# Patient Record
Sex: FEMALE | Race: WHITE | NOT HISPANIC OR LATINO
[De-identification: names, ages, dates, MRNs, and addresses within clinical notes are randomized per-mention and may not be internally consistent; named-entity substitution may affect disease eponyms.]

---

## 2019-06-21 ENCOUNTER — APPOINTMENT (OUTPATIENT)
Dept: ORTHOPEDIC SURGERY | Facility: CLINIC | Age: 79
End: 2019-06-21
Payer: MEDICARE

## 2019-06-21 VITALS — WEIGHT: 132 LBS | BODY MASS INDEX: 21.99 KG/M2 | HEIGHT: 65 IN

## 2019-06-21 DIAGNOSIS — M25.571 PAIN IN RIGHT ANKLE AND JOINTS OF RIGHT FOOT: ICD-10-CM

## 2019-06-21 PROBLEM — Z00.00 ENCOUNTER FOR PREVENTIVE HEALTH EXAMINATION: Status: ACTIVE | Noted: 2019-06-21

## 2019-06-21 PROCEDURE — 99213 OFFICE O/P EST LOW 20 MIN: CPT

## 2019-06-21 PROCEDURE — 73610 X-RAY EXAM OF ANKLE: CPT | Mod: RT

## 2019-06-21 NOTE — DISCUSSION/SUMMARY
[de-identified] : This patient seems to have peroneal tendinitis. I recommended ice and elevation. If the swelling doesn't get better she will let me know. Followup will be as needed.

## 2019-06-21 NOTE — PHYSICAL EXAM
[de-identified] : Right ankle shows no warmth. There is lateral swelling. There is no tenderness of any significance. There is minimal discomfort over the peroneal tendons. Full range of motion negative instability neurovascular exam is normal. [de-identified] : X-ray of the right ankle shows no evidence of fracture or dislocation.

## 2019-06-21 NOTE — HISTORY OF PRESENT ILLNESS
[FreeTextEntry1] : Location: right ankle \par Quality: aching\par Duration:2 weeks \par Context: atraumatic\par Aggravating Factors: prolonged walking , certain shoes \par Conservative treatment: elevation , rest\par Associated Symptoms: swelling\par Prior Studies:n.a\par

## 2019-06-30 ENCOUNTER — INPATIENT (INPATIENT)
Facility: HOSPITAL | Age: 79
LOS: 2 days | Discharge: HOME | DRG: 390 | End: 2019-07-03
Attending: COLON & RECTAL SURGERY | Admitting: COLON & RECTAL SURGERY
Payer: MEDICARE

## 2019-06-30 VITALS
DIASTOLIC BLOOD PRESSURE: 78 MMHG | TEMPERATURE: 98 F | OXYGEN SATURATION: 95 % | RESPIRATION RATE: 16 BRPM | SYSTOLIC BLOOD PRESSURE: 134 MMHG | HEART RATE: 83 BPM

## 2019-06-30 DIAGNOSIS — Z90.49 ACQUIRED ABSENCE OF OTHER SPECIFIED PARTS OF DIGESTIVE TRACT: Chronic | ICD-10-CM

## 2019-06-30 DIAGNOSIS — Z98.89 OTHER SPECIFIED POSTPROCEDURAL STATES: Chronic | ICD-10-CM

## 2019-06-30 LAB
ANION GAP SERPL CALC-SCNC: 13 MMOL/L — SIGNIFICANT CHANGE UP (ref 5–17)
APTT BLD: 24.8 SEC — LOW (ref 27.5–36.3)
BLD GP AB SCN SERPL QL: NEGATIVE — SIGNIFICANT CHANGE UP
BUN SERPL-MCNC: 15 MG/DL — SIGNIFICANT CHANGE UP (ref 7–23)
CALCIUM SERPL-MCNC: 8.7 MG/DL — SIGNIFICANT CHANGE UP (ref 8.4–10.5)
CHLORIDE SERPL-SCNC: 103 MMOL/L — SIGNIFICANT CHANGE UP (ref 96–108)
CO2 SERPL-SCNC: 22 MMOL/L — SIGNIFICANT CHANGE UP (ref 22–31)
CREAT SERPL-MCNC: 0.79 MG/DL — SIGNIFICANT CHANGE UP (ref 0.5–1.3)
GLUCOSE SERPL-MCNC: 96 MG/DL — SIGNIFICANT CHANGE UP (ref 70–99)
HCT VFR BLD CALC: 39 % — SIGNIFICANT CHANGE UP (ref 34.5–45)
HGB BLD-MCNC: 13.5 G/DL — SIGNIFICANT CHANGE UP (ref 11.5–15.5)
INR BLD: 1.04 — SIGNIFICANT CHANGE UP (ref 0.88–1.16)
MAGNESIUM SERPL-MCNC: 2.3 MG/DL — SIGNIFICANT CHANGE UP (ref 1.6–2.6)
MCHC RBC-ENTMCNC: 33.3 PG — SIGNIFICANT CHANGE UP (ref 27–34)
MCHC RBC-ENTMCNC: 34.6 GM/DL — SIGNIFICANT CHANGE UP (ref 32–36)
MCV RBC AUTO: 96.3 FL — SIGNIFICANT CHANGE UP (ref 80–100)
NRBC # BLD: 0 /100 WBCS — SIGNIFICANT CHANGE UP (ref 0–0)
PHOSPHATE SERPL-MCNC: 3.1 MG/DL — SIGNIFICANT CHANGE UP (ref 2.5–4.5)
PLATELET # BLD AUTO: 248 K/UL — SIGNIFICANT CHANGE UP (ref 150–400)
POTASSIUM SERPL-MCNC: 3.6 MMOL/L — SIGNIFICANT CHANGE UP (ref 3.5–5.3)
POTASSIUM SERPL-SCNC: 3.6 MMOL/L — SIGNIFICANT CHANGE UP (ref 3.5–5.3)
PROTHROM AB SERPL-ACNC: 11.8 SEC — SIGNIFICANT CHANGE UP (ref 10–12.9)
RBC # BLD: 4.05 M/UL — SIGNIFICANT CHANGE UP (ref 3.8–5.2)
RBC # FLD: 12.5 % — SIGNIFICANT CHANGE UP (ref 10.3–14.5)
RH IG SCN BLD-IMP: POSITIVE — SIGNIFICANT CHANGE UP
SODIUM SERPL-SCNC: 138 MMOL/L — SIGNIFICANT CHANGE UP (ref 135–145)
WBC # BLD: 7.38 K/UL — SIGNIFICANT CHANGE UP (ref 3.8–10.5)
WBC # FLD AUTO: 7.38 K/UL — SIGNIFICANT CHANGE UP (ref 3.8–10.5)

## 2019-06-30 PROCEDURE — 74019 RADEX ABDOMEN 2 VIEWS: CPT | Mod: 26

## 2019-06-30 PROCEDURE — 71045 X-RAY EXAM CHEST 1 VIEW: CPT | Mod: 26

## 2019-06-30 RX ORDER — POTASSIUM CHLORIDE 20 MEQ
10 PACKET (EA) ORAL
Refills: 0 | Status: COMPLETED | OUTPATIENT
Start: 2019-06-30 | End: 2019-06-30

## 2019-06-30 RX ORDER — HEPARIN SODIUM 5000 [USP'U]/ML
5000 INJECTION INTRAVENOUS; SUBCUTANEOUS EVERY 8 HOURS
Refills: 0 | Status: DISCONTINUED | OUTPATIENT
Start: 2019-06-30 | End: 2019-07-03

## 2019-06-30 RX ORDER — BENZOCAINE AND MENTHOL 5; 1 G/100ML; G/100ML
1 LIQUID ORAL
Refills: 0 | Status: DISCONTINUED | OUTPATIENT
Start: 2019-06-30 | End: 2019-07-03

## 2019-06-30 RX ORDER — SODIUM CHLORIDE 9 MG/ML
1000 INJECTION, SOLUTION INTRAVENOUS
Refills: 0 | Status: DISCONTINUED | OUTPATIENT
Start: 2019-06-30 | End: 2019-07-01

## 2019-06-30 RX ADMIN — Medication 100 MILLIEQUIVALENT(S): at 16:02

## 2019-06-30 RX ADMIN — HEPARIN SODIUM 5000 UNIT(S): 5000 INJECTION INTRAVENOUS; SUBCUTANEOUS at 13:49

## 2019-06-30 RX ADMIN — BENZOCAINE AND MENTHOL 1 LOZENGE: 5; 1 LIQUID ORAL at 21:00

## 2019-06-30 RX ADMIN — Medication 100 MILLIEQUIVALENT(S): at 19:42

## 2019-06-30 RX ADMIN — SODIUM CHLORIDE 100 MILLILITER(S): 9 INJECTION, SOLUTION INTRAVENOUS at 03:53

## 2019-06-30 RX ADMIN — Medication 100 MILLIEQUIVALENT(S): at 13:49

## 2019-06-30 RX ADMIN — HEPARIN SODIUM 5000 UNIT(S): 5000 INJECTION INTRAVENOUS; SUBCUTANEOUS at 21:00

## 2019-06-30 NOTE — H&P ADULT - NSHPPHYSICALEXAM_GEN_ALL_CORE
General: AAOx3, NAD  ENT:  Mucosa moist, no ulcerations  Respiratory: nonlabored breathing, no respiratory distress  CV: NSR  Abdominal: Soft, mild-moderate distention, nontender, no rebound, no guarding  MSK: No edema, + peripheral pulses, FROM all 4 extremity

## 2019-06-30 NOTE — H&P ADULT - NSHPSOCIALHISTORY_GEN_ALL_CORE
Retired . Smoked cigarettes but quit 55 years ago, drinks one alcoholic beverage daily, denies illicit drug use.

## 2019-06-30 NOTE — H&P ADULT - ATTENDING COMMENTS
Reports crampy pain and nausea/vomiting for past 4 days. Has not had similar symptoms or constipation since surgery.  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  1. Continue NGT, IVF  2. OOB, IS  3. Reviewed natural history, treatment options for both types of obstruction.

## 2019-06-30 NOTE — PROGRESS NOTE ADULT - SUBJECTIVE AND OBJECTIVE BOX
24 hr events:  o/n: p/w SBO, NGT placed w/ 200 bilious output, CXR confirmed placement, -N/further emesis, +F intermittently    SUBJECTIVE:  Pt seen and examined by chief resident. Pt is doing well, resting comfortably on bed. Pain controlled. intermittent F. No nausea or vomiting. No complaints at this time.      Vital Signs Last 24 Hrs  T(C): 36.7 (30 Jun 2019 09:00), Max: 37.6 (30 Jun 2019 05:37)  T(F): 98 (30 Jun 2019 09:00), Max: 99.7 (30 Jun 2019 05:37)  HR: 75 (30 Jun 2019 09:00) (72 - 83)  BP: 143/79 (30 Jun 2019 09:00) (134/78 - 143/81)  RR: 15 (30 Jun 2019 09:00) (15 - 16)  SpO2: 97% (30 Jun 2019 09:00) (95% - 98%)    Physical Exam:  General: NAD  HEENT: NGT in place  Pulmonary: Nonlabored breathing, no respiratory distress  Abdominal: soft, NT/ND, no rebound or guarding.   Extremities: warm, well perfused.     Lines/drains/tubes:    I&O's Summary    29 Jun 2019 07:01  -  30 Jun 2019 07:00  --------------------------------------------------------  IN: 400 mL / OUT: 200 mL / NET: 200 mL        LABS:                        13.5   7.38  )-----------( 248      ( 30 Jun 2019 06:50 )             39.0     06-30    138  |  103  |  15  ----------------------------<  96  3.6   |  22  |  0.79    Ca    8.7      30 Jun 2019 06:50  Phos  3.1     06-30  Mg     2.3     06-30      PT/INR - ( 30 Jun 2019 06:50 )   PT: 11.8 sec;   INR: 1.04          PTT - ( 30 Jun 2019 06:50 )  PTT:24.8 sec    CAPILLARY BLOOD GLUCOSE            RADIOLOGY & ADDITIONAL STUDIES:

## 2019-06-30 NOTE — H&P ADULT - ASSESSMENT
78 F hx of bladder prolapse, HLD, PSH bowel resection for numerous precancerous polyps on serial colonoscopy (w/ Dr. Alanis in 2016) p/w SBO    Pain control minimizing narcotics  NPO/IVF  NGT placement  Abd xray 2 view for evaluation of contrast progression  F/u final read of outpt CT Abd/pelvis  HSQ/SCDs for DVT prophylaxis  AM labs

## 2019-06-30 NOTE — H&P ADULT - NSICDXPASTMEDICALHX_GEN_ALL_CORE_FT
PAST MEDICAL HISTORY:  Colon polyps Precancerous    Female bladder prolapse     HLD (hyperlipidemia)     UTI (urinary tract infection)

## 2019-06-30 NOTE — H&P ADULT - HISTORY OF PRESENT ILLNESS
78 F hx of bladder prolapse, HLD, PSH bowel resection for numerous polyps on serial colonoscopy (w/ Dr. Alanis in 2016) presenting with three days of nausea, emesis, and inability to tolerate po. Pt states sx started 6/26 when pt began to feel nauseous after lunch then had multiple episodes of nonbloody emesis the following day that failed to resolve for the next two days. Pt has been passing flatus, however less than usual, and last bowel movement was thursday 6/27. Pt endorses anorexia since Wed. Denies F/C, chest pain/dyspnea. Voiding well without issues. Denies recent travel or sick contacts 78 F hx of bladder prolapse, HLD, PSH bowel resection for numerous precancerous polyps on serial colonoscopy (w/ Dr. Alanis in 2016) p/w 3 days of nausea, emesis, and inability to tolerate po. Pt states sx started 6/26 when she felt nauseous after lunch then had multiple episodes of nonbloody emesis associated with anorexia that failed to resolve for the next two days. Pt was evaluated at [facility] where CT scan demonstrated concern for SBO, and pt was transferred to [facility] for further management given her surgical hx w/ Dr. Alanis. Pt has been passing flatus intermittently, and last bowel movement was thursday 6/27.  Denies F/C, chest pain/dyspnea. Voiding well without issues. Denies recent travel or sick contacts. Pt states more recent colonoscopy within past year has been wnl.

## 2019-06-30 NOTE — PROGRESS NOTE ADULT - ASSESSMENT
78 F hx of bladder prolapse, HLD, PSH bowel resection for numerous precancerous polyps on serial colonoscopy (w/ Dr. Alanis in 2016) p/w SBO s/p NGT placement    Pain control minimizing narcotics  NPO/IVF  NGT placement  Abd xray 2 view for evaluation of contrast progression  F/u final read of outpt CT Abd/pelvis   HSQ/SCDs for DVT prophylaxis  AM labs

## 2019-07-01 LAB
ANION GAP SERPL CALC-SCNC: 14 MMOL/L — SIGNIFICANT CHANGE UP (ref 5–17)
BUN SERPL-MCNC: 15 MG/DL — SIGNIFICANT CHANGE UP (ref 7–23)
CALCIUM SERPL-MCNC: 8.6 MG/DL — SIGNIFICANT CHANGE UP (ref 8.4–10.5)
CHLORIDE SERPL-SCNC: 101 MMOL/L — SIGNIFICANT CHANGE UP (ref 96–108)
CO2 SERPL-SCNC: 20 MMOL/L — LOW (ref 22–31)
CREAT SERPL-MCNC: 0.75 MG/DL — SIGNIFICANT CHANGE UP (ref 0.5–1.3)
GLUCOSE SERPL-MCNC: 100 MG/DL — HIGH (ref 70–99)
HCT VFR BLD CALC: 38.5 % — SIGNIFICANT CHANGE UP (ref 34.5–45)
HGB BLD-MCNC: 13.1 G/DL — SIGNIFICANT CHANGE UP (ref 11.5–15.5)
MAGNESIUM SERPL-MCNC: 1.8 MG/DL — SIGNIFICANT CHANGE UP (ref 1.6–2.6)
MCHC RBC-ENTMCNC: 32.7 PG — SIGNIFICANT CHANGE UP (ref 27–34)
MCHC RBC-ENTMCNC: 34 GM/DL — SIGNIFICANT CHANGE UP (ref 32–36)
MCV RBC AUTO: 96 FL — SIGNIFICANT CHANGE UP (ref 80–100)
NRBC # BLD: 0 /100 WBCS — SIGNIFICANT CHANGE UP (ref 0–0)
PHOSPHATE SERPL-MCNC: 3 MG/DL — SIGNIFICANT CHANGE UP (ref 2.5–4.5)
PLATELET # BLD AUTO: 253 K/UL — SIGNIFICANT CHANGE UP (ref 150–400)
POTASSIUM SERPL-MCNC: 4 MMOL/L — SIGNIFICANT CHANGE UP (ref 3.5–5.3)
POTASSIUM SERPL-SCNC: 4 MMOL/L — SIGNIFICANT CHANGE UP (ref 3.5–5.3)
RBC # BLD: 4.01 M/UL — SIGNIFICANT CHANGE UP (ref 3.8–5.2)
RBC # FLD: 12.4 % — SIGNIFICANT CHANGE UP (ref 10.3–14.5)
SODIUM SERPL-SCNC: 135 MMOL/L — SIGNIFICANT CHANGE UP (ref 135–145)
WBC # BLD: 8.19 K/UL — SIGNIFICANT CHANGE UP (ref 3.8–10.5)
WBC # FLD AUTO: 8.19 K/UL — SIGNIFICANT CHANGE UP (ref 3.8–10.5)

## 2019-07-01 RX ORDER — ACETAMINOPHEN 500 MG
1000 TABLET ORAL ONCE
Refills: 0 | Status: COMPLETED | OUTPATIENT
Start: 2019-07-01 | End: 2019-07-01

## 2019-07-01 RX ORDER — ONDANSETRON 8 MG/1
4 TABLET, FILM COATED ORAL ONCE
Refills: 0 | Status: COMPLETED | OUTPATIENT
Start: 2019-07-01 | End: 2019-07-01

## 2019-07-01 RX ORDER — SODIUM CHLORIDE 9 MG/ML
1000 INJECTION, SOLUTION INTRAVENOUS
Refills: 0 | Status: DISCONTINUED | OUTPATIENT
Start: 2019-07-01 | End: 2019-07-03

## 2019-07-01 RX ORDER — SODIUM CHLORIDE 9 MG/ML
1000 INJECTION, SOLUTION INTRAVENOUS
Refills: 0 | Status: DISCONTINUED | OUTPATIENT
Start: 2019-07-01 | End: 2019-07-01

## 2019-07-01 RX ORDER — MAGNESIUM SULFATE 500 MG/ML
1 VIAL (ML) INJECTION ONCE
Refills: 0 | Status: COMPLETED | OUTPATIENT
Start: 2019-07-01 | End: 2019-07-01

## 2019-07-01 RX ORDER — ONDANSETRON 8 MG/1
4 TABLET, FILM COATED ORAL EVERY 6 HOURS
Refills: 0 | Status: DISCONTINUED | OUTPATIENT
Start: 2019-07-01 | End: 2019-07-03

## 2019-07-01 RX ORDER — ACETAMINOPHEN 500 MG
1000 TABLET ORAL ONCE
Refills: 0 | Status: COMPLETED | OUTPATIENT
Start: 2019-07-01 | End: 2019-07-02

## 2019-07-01 RX ADMIN — BENZOCAINE AND MENTHOL 1 LOZENGE: 5; 1 LIQUID ORAL at 17:26

## 2019-07-01 RX ADMIN — BENZOCAINE AND MENTHOL 1 LOZENGE: 5; 1 LIQUID ORAL at 11:27

## 2019-07-01 RX ADMIN — HEPARIN SODIUM 5000 UNIT(S): 5000 INJECTION INTRAVENOUS; SUBCUTANEOUS at 21:49

## 2019-07-01 RX ADMIN — SODIUM CHLORIDE 100 MILLILITER(S): 9 INJECTION, SOLUTION INTRAVENOUS at 20:01

## 2019-07-01 RX ADMIN — ONDANSETRON 4 MILLIGRAM(S): 8 TABLET, FILM COATED ORAL at 14:30

## 2019-07-01 RX ADMIN — SODIUM CHLORIDE 100 MILLILITER(S): 9 INJECTION, SOLUTION INTRAVENOUS at 11:27

## 2019-07-01 RX ADMIN — HEPARIN SODIUM 5000 UNIT(S): 5000 INJECTION INTRAVENOUS; SUBCUTANEOUS at 05:41

## 2019-07-01 RX ADMIN — Medication 400 MILLIGRAM(S): at 17:26

## 2019-07-01 RX ADMIN — Medication 1000 MILLIGRAM(S): at 17:56

## 2019-07-01 RX ADMIN — ONDANSETRON 4 MILLIGRAM(S): 8 TABLET, FILM COATED ORAL at 04:35

## 2019-07-01 RX ADMIN — Medication 400 MILLIGRAM(S): at 05:41

## 2019-07-01 RX ADMIN — BENZOCAINE AND MENTHOL 1 LOZENGE: 5; 1 LIQUID ORAL at 05:41

## 2019-07-01 RX ADMIN — HEPARIN SODIUM 5000 UNIT(S): 5000 INJECTION INTRAVENOUS; SUBCUTANEOUS at 14:30

## 2019-07-01 RX ADMIN — Medication 100 GRAM(S): at 11:28

## 2019-07-01 RX ADMIN — Medication 1000 MILLIGRAM(S): at 06:00

## 2019-07-01 NOTE — PROGRESS NOTE ADULT - ASSESSMENT
78 F hx of bladder prolapse, HLD, PSH bowel resection for numerous precancerous polyps on serial colonoscopy (w/ Dr. Alanis in 2016) p/w SBO s/p NGT placement    Pain control minimizing narcotics  NPO/IVF  NGT   HSQ/SCDs for DVT prophylaxis  AM labs    upload and review CT scan from outside hospital

## 2019-07-01 NOTE — PROGRESS NOTE ADULT - SUBJECTIVE AND OBJECTIVE BOX
24 hr events:  O/N: -BF, NGT o/p: 425cc, VSS  6/30: AXR: air in rectum, no contrast noted on xr. +intermittent flatus.     SUBJECTIVE:  Pt seen and examined by chief resident. Pt is doing okay, resting comfortably on bed. Didnt sleep much last night. Reporting some abdominal soreness from retching. +F last night. -BM. No nausea or vomiting this AM. No complaints at this time.      Vital Signs Last 24 Hrs  T(C): 36.7 (01 Jul 2019 08:37), Max: 37.5 (30 Jun 2019 20:00)  T(F): 98 (01 Jul 2019 08:37), Max: 99.5 (30 Jun 2019 20:00)  HR: 100 (01 Jul 2019 08:37) (76 - 100)  BP: 118/57 (01 Jul 2019 08:37) (118/57 - 150/81)  RR: 16 (01 Jul 2019 08:37) (16 - 17)  SpO2: 95% (01 Jul 2019 08:37) (94% - 95%)    Physical Exam:  General: NAD  HEENT: NGT in place  Pulmonary: Nonlabored breathing, no respiratory distress  Abdominal: soft, nontender, mild distention. No rebound or guarding.   Extremities: warm, well perfused.     I&O's Summary    30 Jun 2019 07:01  -  01 Jul 2019 07:00  --------------------------------------------------------  IN: 0 mL / OUT: 1300 mL / NET: -1300 mL        LABS:                        13.1   8.19  )-----------( 253      ( 01 Jul 2019 07:52 )             38.5     07-01    135  |  101  |  15  ----------------------------<  100<H>  4.0   |  20<L>  |  0.75    Ca    8.6      01 Jul 2019 07:52  Phos  3.0     07-01  Mg     1.8     07-01      PT/INR - ( 30 Jun 2019 06:50 )   PT: 11.8 sec;   INR: 1.04          PTT - ( 30 Jun 2019 06:50 )  PTT:24.8 sec

## 2019-07-01 NOTE — PROGRESS NOTE ADULT - SUBJECTIVE AND OBJECTIVE BOX
Partial SBO  passing flatus  No BM  AVSS  ABD: softly distended nontender    OOB  NGT  NPO  Correct IVF

## 2019-07-02 LAB
ANION GAP SERPL CALC-SCNC: 13 MMOL/L — SIGNIFICANT CHANGE UP (ref 5–17)
BUN SERPL-MCNC: 11 MG/DL — SIGNIFICANT CHANGE UP (ref 7–23)
CALCIUM SERPL-MCNC: 8.6 MG/DL — SIGNIFICANT CHANGE UP (ref 8.4–10.5)
CHLORIDE SERPL-SCNC: 97 MMOL/L — SIGNIFICANT CHANGE UP (ref 96–108)
CO2 SERPL-SCNC: 23 MMOL/L — SIGNIFICANT CHANGE UP (ref 22–31)
CREAT SERPL-MCNC: 0.69 MG/DL — SIGNIFICANT CHANGE UP (ref 0.5–1.3)
GLUCOSE SERPL-MCNC: 108 MG/DL — HIGH (ref 70–99)
HCT VFR BLD CALC: 38.6 % — SIGNIFICANT CHANGE UP (ref 34.5–45)
HGB BLD-MCNC: 13.2 G/DL — SIGNIFICANT CHANGE UP (ref 11.5–15.5)
MAGNESIUM SERPL-MCNC: 1.9 MG/DL — SIGNIFICANT CHANGE UP (ref 1.6–2.6)
MCHC RBC-ENTMCNC: 32.8 PG — SIGNIFICANT CHANGE UP (ref 27–34)
MCHC RBC-ENTMCNC: 34.2 GM/DL — SIGNIFICANT CHANGE UP (ref 32–36)
MCV RBC AUTO: 95.8 FL — SIGNIFICANT CHANGE UP (ref 80–100)
NRBC # BLD: 0 /100 WBCS — SIGNIFICANT CHANGE UP (ref 0–0)
PHOSPHATE SERPL-MCNC: 2.2 MG/DL — LOW (ref 2.5–4.5)
PLATELET # BLD AUTO: 282 K/UL — SIGNIFICANT CHANGE UP (ref 150–400)
POTASSIUM SERPL-MCNC: 3.6 MMOL/L — SIGNIFICANT CHANGE UP (ref 3.5–5.3)
POTASSIUM SERPL-SCNC: 3.6 MMOL/L — SIGNIFICANT CHANGE UP (ref 3.5–5.3)
RBC # BLD: 4.03 M/UL — SIGNIFICANT CHANGE UP (ref 3.8–5.2)
RBC # FLD: 12.4 % — SIGNIFICANT CHANGE UP (ref 10.3–14.5)
SODIUM SERPL-SCNC: 133 MMOL/L — LOW (ref 135–145)
WBC # BLD: 7.97 K/UL — SIGNIFICANT CHANGE UP (ref 3.8–10.5)
WBC # FLD AUTO: 7.97 K/UL — SIGNIFICANT CHANGE UP (ref 3.8–10.5)

## 2019-07-02 RX ORDER — MAGNESIUM SULFATE 500 MG/ML
1 VIAL (ML) INJECTION ONCE
Refills: 0 | Status: COMPLETED | OUTPATIENT
Start: 2019-07-02 | End: 2019-07-02

## 2019-07-02 RX ORDER — POTASSIUM PHOSPHATE, MONOBASIC POTASSIUM PHOSPHATE, DIBASIC 236; 224 MG/ML; MG/ML
15 INJECTION, SOLUTION INTRAVENOUS ONCE
Refills: 0 | Status: COMPLETED | OUTPATIENT
Start: 2019-07-02 | End: 2019-07-02

## 2019-07-02 RX ORDER — ACETAMINOPHEN 500 MG
1000 TABLET ORAL ONCE
Refills: 0 | Status: COMPLETED | OUTPATIENT
Start: 2019-07-02 | End: 2019-07-02

## 2019-07-02 RX ADMIN — Medication 400 MILLIGRAM(S): at 08:01

## 2019-07-02 RX ADMIN — Medication 1000 MILLIGRAM(S): at 08:30

## 2019-07-02 RX ADMIN — BENZOCAINE AND MENTHOL 1 LOZENGE: 5; 1 LIQUID ORAL at 00:36

## 2019-07-02 RX ADMIN — HEPARIN SODIUM 5000 UNIT(S): 5000 INJECTION INTRAVENOUS; SUBCUTANEOUS at 05:27

## 2019-07-02 RX ADMIN — Medication 1000 MILLIGRAM(S): at 01:15

## 2019-07-02 RX ADMIN — Medication 100 GRAM(S): at 11:03

## 2019-07-02 RX ADMIN — Medication 400 MILLIGRAM(S): at 00:36

## 2019-07-02 RX ADMIN — POTASSIUM PHOSPHATE, MONOBASIC POTASSIUM PHOSPHATE, DIBASIC 63.75 MILLIMOLE(S): 236; 224 INJECTION, SOLUTION INTRAVENOUS at 14:30

## 2019-07-02 RX ADMIN — HEPARIN SODIUM 5000 UNIT(S): 5000 INJECTION INTRAVENOUS; SUBCUTANEOUS at 14:30

## 2019-07-02 RX ADMIN — HEPARIN SODIUM 5000 UNIT(S): 5000 INJECTION INTRAVENOUS; SUBCUTANEOUS at 21:18

## 2019-07-02 RX ADMIN — BENZOCAINE AND MENTHOL 1 LOZENGE: 5; 1 LIQUID ORAL at 08:01

## 2019-07-02 NOTE — PROGRESS NOTE ADULT - ASSESSMENT
78 F hx of bladder prolapse, HLD, PSH laparoscopic R hemicolectomy for numerous precancerous polyps on serial colonoscopy (w/ Dr. Alanis in 2016) p/w SBO s/p NGT placement    Pain control minimizing narcotics  NPO/IVF  dc NGT   HSQ/SCDs for DVT prophylaxis  AM labs

## 2019-07-02 NOTE — PROGRESS NOTE ADULT - SUBJECTIVE AND OBJECTIVE BOX
tolerating NGT removal   no nausea  passing flatus  AVSS  Soft less distended nontender  CBC and elytes NL    OOB  Chewing gum  NPO  IVF

## 2019-07-02 NOTE — PROGRESS NOTE ADULT - SUBJECTIVE AND OBJECTIVE BOX
24 hr events:  O/N: GILBERT, NGT o/p: 75cc  7/1: +F/-BM. NGT output 300. Spitting up 100cc. ambulating. Outside CT from 6/29 discussed with radiology attending struction aroudn the site of anastomosis of the R colon. partial obstuction.     SUBJECTIVE:  Pt seen and examined by chief resident. Pt is doing well, resting comfortably on bed.  Ambulating out of bed. +F/-BM. No nausea or vomiting. No complaints at this time.        Vital Signs Last 24 Hrs  T(C): 37.2 (02 Jul 2019 07:57), Max: 37.3 (01 Jul 2019 20:10)  T(F): 98.9 (02 Jul 2019 07:57), Max: 99.1 (01 Jul 2019 20:10)  HR: 93 (02 Jul 2019 07:57) (91 - 96)  BP: 147/86 (02 Jul 2019 07:57) (145/82 - 155/83)  RR: 16 (02 Jul 2019 07:57) (16 - 18)  SpO2: 94% (02 Jul 2019 07:57) (94% - 96%)    Physical Exam:  General: NAD  Pulmonary: Nonlabored breathing, no respiratory distress  Abdominal: soft, nontender, mild distention. No rebound or guarding.   Extremities: warm, well perfused     Lines/drains/tubes:    I&O's Summary    01 Jul 2019 07:01  -  02 Jul 2019 07:00  --------------------------------------------------------  IN: 2470 mL / OUT: 1125 mL / NET: 1345 mL    02 Jul 2019 07:01  -  02 Jul 2019 10:51  --------------------------------------------------------  IN: 170 mL / OUT: 300 mL / NET: -130 mL        LABS:                        13.2   7.97  )-----------( 282      ( 02 Jul 2019 06:56 )             38.6     07-02    133<L>  |  97  |  11  ----------------------------<  108<H>  3.6   |  23  |  0.69    Ca    8.6      02 Jul 2019 06:56  Phos  2.2     07-02  Mg     1.9     07-02

## 2019-07-03 ENCOUNTER — TRANSCRIPTION ENCOUNTER (OUTPATIENT)
Age: 79
End: 2019-07-03

## 2019-07-03 VITALS
SYSTOLIC BLOOD PRESSURE: 132 MMHG | DIASTOLIC BLOOD PRESSURE: 79 MMHG | HEART RATE: 84 BPM | TEMPERATURE: 99 F | RESPIRATION RATE: 16 BRPM | OXYGEN SATURATION: 97 %

## 2019-07-03 LAB
ANION GAP SERPL CALC-SCNC: 11 MMOL/L — SIGNIFICANT CHANGE UP (ref 5–17)
BUN SERPL-MCNC: 9 MG/DL — SIGNIFICANT CHANGE UP (ref 7–23)
CALCIUM SERPL-MCNC: 8.1 MG/DL — LOW (ref 8.4–10.5)
CHLORIDE SERPL-SCNC: 103 MMOL/L — SIGNIFICANT CHANGE UP (ref 96–108)
CO2 SERPL-SCNC: 23 MMOL/L — SIGNIFICANT CHANGE UP (ref 22–31)
CREAT SERPL-MCNC: 0.73 MG/DL — SIGNIFICANT CHANGE UP (ref 0.5–1.3)
GLUCOSE SERPL-MCNC: 88 MG/DL — SIGNIFICANT CHANGE UP (ref 70–99)
HCT VFR BLD CALC: 36.4 % — SIGNIFICANT CHANGE UP (ref 34.5–45)
HGB BLD-MCNC: 12 G/DL — SIGNIFICANT CHANGE UP (ref 11.5–15.5)
MAGNESIUM SERPL-MCNC: 2 MG/DL — SIGNIFICANT CHANGE UP (ref 1.6–2.6)
MCHC RBC-ENTMCNC: 32.4 PG — SIGNIFICANT CHANGE UP (ref 27–34)
MCHC RBC-ENTMCNC: 33 GM/DL — SIGNIFICANT CHANGE UP (ref 32–36)
MCV RBC AUTO: 98.4 FL — SIGNIFICANT CHANGE UP (ref 80–100)
NRBC # BLD: 0 /100 WBCS — SIGNIFICANT CHANGE UP (ref 0–0)
PHOSPHATE SERPL-MCNC: 2.3 MG/DL — LOW (ref 2.5–4.5)
PLATELET # BLD AUTO: 273 K/UL — SIGNIFICANT CHANGE UP (ref 150–400)
POTASSIUM SERPL-MCNC: 4.1 MMOL/L — SIGNIFICANT CHANGE UP (ref 3.5–5.3)
POTASSIUM SERPL-SCNC: 4.1 MMOL/L — SIGNIFICANT CHANGE UP (ref 3.5–5.3)
RBC # BLD: 3.7 M/UL — LOW (ref 3.8–5.2)
RBC # FLD: 12.7 % — SIGNIFICANT CHANGE UP (ref 10.3–14.5)
SODIUM SERPL-SCNC: 137 MMOL/L — SIGNIFICANT CHANGE UP (ref 135–145)
WBC # BLD: 6.34 K/UL — SIGNIFICANT CHANGE UP (ref 3.8–10.5)
WBC # FLD AUTO: 6.34 K/UL — SIGNIFICANT CHANGE UP (ref 3.8–10.5)

## 2019-07-03 PROCEDURE — 83735 ASSAY OF MAGNESIUM: CPT

## 2019-07-03 PROCEDURE — 71045 X-RAY EXAM CHEST 1 VIEW: CPT

## 2019-07-03 PROCEDURE — 36415 COLL VENOUS BLD VENIPUNCTURE: CPT

## 2019-07-03 PROCEDURE — 86900 BLOOD TYPING SEROLOGIC ABO: CPT

## 2019-07-03 PROCEDURE — 80048 BASIC METABOLIC PNL TOTAL CA: CPT

## 2019-07-03 PROCEDURE — 85730 THROMBOPLASTIN TIME PARTIAL: CPT

## 2019-07-03 PROCEDURE — 86850 RBC ANTIBODY SCREEN: CPT

## 2019-07-03 PROCEDURE — 85027 COMPLETE CBC AUTOMATED: CPT

## 2019-07-03 PROCEDURE — 74019 RADEX ABDOMEN 2 VIEWS: CPT

## 2019-07-03 PROCEDURE — 84100 ASSAY OF PHOSPHORUS: CPT

## 2019-07-03 PROCEDURE — 86901 BLOOD TYPING SEROLOGIC RH(D): CPT

## 2019-07-03 PROCEDURE — 85610 PROTHROMBIN TIME: CPT

## 2019-07-03 RX ORDER — DOCUSATE SODIUM 100 MG
100 CAPSULE ORAL
Refills: 0 | Status: DISCONTINUED | OUTPATIENT
Start: 2019-07-03 | End: 2019-07-03

## 2019-07-03 RX ORDER — DOCUSATE SODIUM 100 MG
1 CAPSULE ORAL
Qty: 60 | Refills: 0
Start: 2019-07-03 | End: 2019-08-01

## 2019-07-03 RX ADMIN — Medication 62.5 MILLIMOLE(S): at 09:18

## 2019-07-03 RX ADMIN — HEPARIN SODIUM 5000 UNIT(S): 5000 INJECTION INTRAVENOUS; SUBCUTANEOUS at 13:58

## 2019-07-03 RX ADMIN — HEPARIN SODIUM 5000 UNIT(S): 5000 INJECTION INTRAVENOUS; SUBCUTANEOUS at 05:39

## 2019-07-03 NOTE — PROGRESS NOTE ADULT - SUBJECTIVE AND OBJECTIVE BOX
24 hr events:  O/N: huy CLD, +flatus + loose/watery bm x 2, OOBA  7/2: dc'd NGT, +F/-BM, ambulating. No nausea or vomiting. Adv to CLD    SUBJECTIVE:  Pt seen and examined by chief resident. Pt feels "terrific," was able to get good sleep last night. No pain. Clear liquid diet tolerated.  Ambulating out of bed. +F/+BMs last night. No nausea or vomiting. No complaints at this time.    Vital Signs Last 24 Hrs  T(C): 36.8 (03 Jul 2019 00:32), Max: 37.8 (02 Jul 2019 20:40)  T(F): 98.2 (03 Jul 2019 00:32), Max: 100 (02 Jul 2019 20:40)  HR: 88 (03 Jul 2019 00:32) (78 - 94)  BP: 126/75 (03 Jul 2019 00:32) (126/75 - 158/80)  RR: 16 (03 Jul 2019 00:32) (16 - 18)  SpO2: 98% (03 Jul 2019 00:32) (94% - 98%)    Physical Exam:  General: NAD  Pulmonary: Nonlabored breathing, no respiratory distress  Abdominal: soft, nontender, nondistended. No rebound or guarding.   Extremities: warm, well perfused.       I&O's Summary    02 Jul 2019 07:01  -  03 Jul 2019 07:00  --------------------------------------------------------  IN: 2460 mL / OUT: 300 mL / NET: 2160 mL        LABS:                        12.0   6.34  )-----------( 273      ( 03 Jul 2019 06:58 )             36.4     07-03    137  |  103  |  9   ----------------------------<  88  4.1   |  23  |  0.73    Ca    8.1<L>      03 Jul 2019 06:58  Phos  2.3     07-03  Mg     2.0     07-03          CAPILLARY BLOOD GLUCOSE            RADIOLOGY & ADDITIONAL STUDIES:

## 2019-07-03 NOTE — DIETITIAN INITIAL EVALUATION ADULT. - ADD RECOMMEND
1. continue on low fiber diet 2. Bowel regimen per team 3. monitor for s/s intolerance 4. trend weights.

## 2019-07-03 NOTE — DIETITIAN INITIAL EVALUATION ADULT. - MALNUTRITION
noted, pt w/ poor PO intake x1 week. no significant wt changes of physical signs of muscle wasting/fat loss.

## 2019-07-03 NOTE — PROGRESS NOTE ADULT - ASSESSMENT
78 F hx of bladder prolapse, HLD, PSH laparoscopic R hemicolectomy for numerous precancerous polyps on serial colonoscopy (w/ Dr. Alanis in 2016) p/w SBO s/p NGT placement    Pain control minimizing narcotics  Adv to LRD  HSQ/SCDs for DVT prophylaxis  AM labs    IS

## 2019-07-03 NOTE — DISCHARGE NOTE PROVIDER - HOSPITAL COURSE
78 F hx of bladder prolapse, HLD, PSH right hemicolectomy for polyps +adenocarcinoma on serial colonoscopy (w/ Dr. Alanis in 2016) presented to [facility] on 6/29 with 3 days of nausea, emesis, and inability to tolerate po. Pt states sx started 6/26 when she felt nauseous after lunch then had multiple episodes of nonbloody emesis associated with anorexia that failed to resolve for the next two days. Pt was evaluated at [facility] earlier on 6/29 where CT scan demonstrated concern for SBO, and pt was transferred to [facility] for further management given her surgical hx w/ Dr. Alanis. Pt has been passing flatus intermittently, and last bowel movement was thursday 6/27. On admission overnight on 6/29, pt had NGT placed, was made NPO and started on fluids. On 7/2, pt continued to pass flatus and NGT output was decreasing so NGT was dc'd and started on clears. Overnight on 7/2 pt had BM, was less distended, and was started on LRD on 7/3. On day of discharge pt was stable to be dc'd home.

## 2019-07-03 NOTE — DIETITIAN INITIAL EVALUATION ADULT. - OTHER INFO
77yo F hx of bladder prolapse, HLD, PSH laparoscopic R hemicolectomy for numerous precancerous polyps on serial colonoscopy (w/ Dr. Alanis in 2016) p/w SBO s/p NGT placement (removed yesterday 7/2). Pt seen in room, resting in bed. Currently on a low fiber diet and tolerating PO. Had eggs at breakfast this am. Denies N/V, having BMs. Reports last time having a real meal w/ solid food was Wednesday 6/26 d/t onset of symptoms. Pt typically consumes a high fiber diet- discussed alternative foods for low fiber diet x1 week. Pt receptive and expressed understanding. NKFA or dietary restrictions. Denies any recent wt changes. Skin intact; GI: distended, abdominal discomfort per flowsheet.

## 2019-07-03 NOTE — DISCHARGE NOTE PROVIDER - NSDCFUADDINST_GEN_ALL_CORE_FT
Warning Signs:  Please call your doctor if you experience the following:  *You experience new chest pain, pressure, squeezing or tightness.  *New or worsening cough, shortness of breath, or wheeze.  *If you are vomiting and cannot keep down fluids or your medications.  *You are getting dehydrated due to continued vomiting, diarrhea, or other reasons. Signs of dehydration include dry mouth, rapid heartbeat, or feeling dizzy or faint when standing.  *You see blood or dark/black material when you vomit or have a bowel movement.  *You experience burning when you urinate, have blood in your urine, or experience a discharge.  *Your pain is not improving within 8-12 hours or is not gone within 24 hours. Call or return immediately if your pain is getting worse, changes location, or moves to your chest or back.  *You have shaking chills, or fever greater than 101.5 degrees Fahrenheit or 38 degrees Celsius.  *Any change in your symptoms, or any new symptoms that concern you.

## 2019-07-03 NOTE — DIETITIAN INITIAL EVALUATION ADULT. - ENERGY NEEDS
Height: 5'5" Weight: 134lbs, IBW 125lbs+/-10%, %%, BMI 22.3  IBW used for calculations as pt >120% of IBW   Nutrient needs based on [facility] standards of care for maintenance in older adults.

## 2019-07-03 NOTE — DISCHARGE NOTE PROVIDER - CARE PROVIDER_API CALL
Amauri Alanis)  ColonRectal Surgery  [address], [address]  [place] [address]  Phone: [phone]  Fax: [phone]  Follow Up Time:

## 2019-07-03 NOTE — DISCHARGE NOTE NURSING/CASE MANAGEMENT/SOCIAL WORK - NSDCDPATPORTLINK_GEN_ALL_CORE
You can access the [facility] Patient Portal, offered by [facility], by registering with the following website: [email or web address]

## 2019-07-03 NOTE — DISCHARGE NOTE PROVIDER - NSDCCPCAREPLAN_GEN_ALL_CORE_FT
PRINCIPAL DISCHARGE DIAGNOSIS  Diagnosis: SBO (small bowel obstruction)  Assessment and Plan of Treatment: Follow Up:  - Please follow up with Dr. Alanis in 2 weeks; you may call the office to make an appointment at your earliest convenience.  General Discharge Instructions:  - Please resume all regular home medications unless specifically advised not to take a particular medication. Also, please take any new medications as prescribed. You have been prescribed Colace 100 mg twice a day for constipation.   - Please get plenty of rest, continue to ambulate several times per day, and drink adequate amounts of fluids.   - Please follow-up with your surgeon and Primary Care Provider (PCP) in 2 weeks

## 2019-07-10 DIAGNOSIS — R10.9 UNSPECIFIED ABDOMINAL PAIN: ICD-10-CM

## 2019-07-10 DIAGNOSIS — Z98.890 OTHER SPECIFIED POSTPROCEDURAL STATES: ICD-10-CM

## 2019-07-10 DIAGNOSIS — K56.50 INTESTINAL ADHESIONS [BANDS], UNSPECIFIED AS TO PARTIAL VERSUS COMPLETE OBSTRUCTION: ICD-10-CM

## 2019-07-10 DIAGNOSIS — E78.5 HYPERLIPIDEMIA, UNSPECIFIED: ICD-10-CM

## 2019-07-10 DIAGNOSIS — N81.10 CYSTOCELE, UNSPECIFIED: ICD-10-CM

## 2020-11-20 ENCOUNTER — TRANSCRIPTION ENCOUNTER (OUTPATIENT)
Age: 80
End: 2020-11-20

## 2020-12-22 ENCOUNTER — TRANSCRIPTION ENCOUNTER (OUTPATIENT)
Age: 80
End: 2020-12-22

## 2022-10-10 ENCOUNTER — NON-APPOINTMENT (OUTPATIENT)
Age: 82
End: 2022-10-10